# Patient Record
(demographics unavailable — no encounter records)

---

## 2024-11-15 NOTE — HISTORY OF PRESENT ILLNESS
[FreeTextEntry1] : Ms. CHIOMA TYLER a 62 year White female  presents today for  a hepatology appointment. She used to be seen by Dr. Prasanna Herron and Dr. Rodriugez previously.   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 post OLT 1/27/2023.  CMV risk-- intermediate (D/R negatiev/positive).  Post surgical noted with mild increase of LFTs effectively managed with a gradual reduction in her solumedrol dosage. Subsequent to her OLT, the patient was discharged from [facility] on February 24, 2023. Due to a bout of diarrhea (with negative gastrointestinal PCR findings), her medication was changed from Cellcept to Myfortic 360mg twice daily.  VERA-- permcath inserted on February 6 2023, and intermittent hemodialysis (HD) was carried out during her stay at [facility]hab, with the final HD session taking place on February 28. The HD catheter was then removed on March 9. Stable CKD with Cr around 1.5 with minimizing CNI.   Psych Hx: Actively seeking assistance before OLT, attends [facility] addiction program at [place] three times a week as part of her recovery program led by Dr. Hugo Caputo, with psychiatric care provided by Dr. Albert. In her social history, it is noted that she resides in [place] and has refrained from alcohol use since her last drink on December 25, 2022.  She denies any history of tobacco or illicit drug use, herbs and dietary supplements.  There is no family history of liver disease.  Expressed a strong craving for alcohol and is open to participating in an inpatient alcohol rehabilitation program. PETH positive >400 since August 2023.  9/2023 [facility] inpatient addiction program but decided to leave against medical advice (AMA) due to concerns about her medication regimen. It's worth noting that she has been consuming alcohol during this time, typically having two glasses of wine once a week and occasionally during social events. She mentioned that she drinks alongside her . Started acamprosate per Dr. Rodriguez.  11/2023 She started Vivitrol about a month ago and no longer taking Acampro. Peth continue to be positive.  Last Vivitrol shot 2/2024.    Interval history LOV 6/27/2024 - Patient present to clinic today appearing well.  - Missed her last appointment with us in October. Was busy taking care of her mother with recent dx of cancer. Mother lives in [facility] and difficult to travel back and forth to [place] per patient. - She has been compliant with attending meetings in [facility] and psychiatry appointments.  - She admits to ongoing alcohol use but states only on occasions last drink was a few days ago had 3 glasses of champagne to celebrate her mom's birthday.  - She shared her anxiety regarding her mother's health, son's upcoming wedding and daughter who now have little contact with.  - Noted ventral hernia around the left upper abdomen surgical incision site. reducible. Non tender.  - Denies fever, chills, nausea, vomiting, jaundice, melena, confusion or unintentional weight loss. - Planning to see plastic surgery for breast reduction since this has been the source of her back pain.    Current IS:  Cyclo 75/50 BID  Myfortic 180 BID  Pred 5 PPhx: ASA, Calcium, Keppra, PPI.   Last imaging:  MRI ABD done in 7/2024 IMPRESSION: New mild diffuse hepatic steatosis compared with prior MRI 8/3/2023. Stable 0.8 cm left hepatic lobe hemangioma. No suspicious focal liver lesions. No biliary stricture. No biliary ductal dilatation. Small likely hemorrhagic collection medial to the pancreatic uncinate process is decreased in size compared with CT 4/6/2023, possibly evolving postoperative hematoma or pancreatic pseudocyst. Continued attention on follow-up is advised.

## 2024-11-15 NOTE — HISTORY OF PRESENT ILLNESS
[FreeTextEntry1] : Ms. CHIOMA TYLER a 62 year White female  presents today for  a hepatology appointment. She used to be seen by Dr. Prasanna Herron and Dr. Rodriguez previously.   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 post OLT 1/27/2023.  CMV risk-- intermediate (D/R negatiev/positive).  Post surgical noted with mild increase of LFTs effectively managed with a gradual reduction in her solumedrol dosage. Subsequent to her OLT, the patient was discharged from [facility] on February 24, 2023. Due to a bout of diarrhea (with negative gastrointestinal PCR findings), her medication was changed from Cellcept to Myfortic 360mg twice daily.  VERA-- permcath inserted on February 6 2023, and intermittent hemodialysis (HD) was carried out during her stay at [facility], with the final HD session taking place on February 28. The HD catheter was then removed on March 9. Stable CKD with Cr around 1.5 with minimizing CNI.   Psych Hx: Actively seeking assistance before OLT, attends [facility]women's addiction program at [place] three times a week as part of her recovery program led by Dr. Hugo Caputo, with psychiatric care provided by Dr. Albert. In her social history, it is noted that she resides in [place] and has refrained from alcohol use since her last drink on December 25, 2022.  She denies any history of tobacco or illicit drug use, herbs and dietary supplements.  There is no family history of liver disease.  Expressed a strong craving for alcohol and is open to participating in an inpatient alcohol rehabilitation program. PETH positive >400 since August 2023.  9/2023 [facility] inpatient addiction program but decided to leave against medical advice (AMA) due to concerns about her medication regimen. It's worth noting that she has been consuming alcohol during this time, typically having two glasses of wine once a week and occasionally during social events. She mentioned that she drinks alongside her . Started acamprosate per Dr. Rodriguez.  11/2023 She started Vivitrol about a month ago and no longer taking Acampro. Peth continue to be positive.  Last Vivitrol shot 2/2024.    Interval history LOV 6/27/2024 - Patient present to clinic today appearing well.  - Missed her last appointment with us in October. Was busy taking care of her mother with recent dx of cancer. Mother lives in [facility] and difficult to travel back and forth to [place] per patient. - She has been compliant with attending meetings in [facility] and psychiatry appointments.  - She admits to ongoing alcohol use but states only on occasions last drink was a few days ago had 3 glasses of champagne to celebrate her mom's birthday.  - She shared her anxiety regarding her mother's health, son's upcoming wedding and daughter who now have little contact with.  - Noted ventral hernia around the left upper abdomen surgical incision site. reducible. Non tender.  - Denies fever, chills, nausea, vomiting, jaundice, melena, confusion or unintentional weight loss. - Planning to see plastic surgery for breast reduction since this has been the source of her back pain.    Current IS:  Cyclo 75/50 BID  Myfortic 180 BID  Pred 5 PPhx: ASA, Calcium, Keppra, PPI.   Last imaging:  MRI ABD done in 7/2024 IMPRESSION: New mild diffuse hepatic steatosis compared with prior MRI 8/3/2023. Stable 0.8 cm left hepatic lobe hemangioma. No suspicious focal liver lesions. No biliary stricture. No biliary ductal dilatation. Small likely hemorrhagic collection medial to the pancreatic uncinate process is decreased in size compared with CT 4/6/2023, possibly evolving postoperative hematoma or pancreatic pseudocyst. Continued attention on follow-up is advised.

## 2024-11-15 NOTE — REVIEW OF SYSTEMS
[As Noted in HPI] : as noted in HPI [Negative] : Heme/Lymph [Fever] : no fever [Chills] : no chills [Vomiting] : no vomiting [Constipation] : no constipation [Diarrhea] : no diarrhea [Heartburn] : no heartburn [Melena] : no melena [FreeTextEntry7] : abd discomfort

## 2024-11-15 NOTE — PHYSICAL EXAM
[Non-Tender] : non-tender [General Appearance - Alert] : alert [General Appearance - In No Acute Distress] : in no acute distress [General Appearance - Well Nourished] : well nourished [Sclera] : the sclera and conjunctiva were normal [Exaggerated Use Of Accessory Muscles For Inspiration] : no accessory muscle use [Bowel Sounds] : normal bowel sounds [Abdomen Soft] : soft [Abdomen Tenderness] : non-tender [] : no hepato-splenomegaly [Abdomen Mass (___ Cm)] : no abdominal mass palpated [Abnormal Walk] : normal gait [Nail Clubbing] : no clubbing  or cyanosis of the fingernails [Skin Color & Pigmentation] : normal skin color and pigmentation [Oriented To Time, Place, And Person] : oriented to person, place, and time [Scleral Icterus] : No Scleral Icterus [Spider Angioma] : No spider angioma(s) were observed [Abdominal  Ascites] : no ascites [Splenomegaly] : no splenomegaly [Asterixis] : no asterixis observed [Jaundice] : No jaundice [Palmar Erythema] : no Palmar Erythema [Depression] : no depression [FreeTextEntry4] : Surgery scars well healed [FreeTextEntry1] : Very combative during the visit

## 2024-11-15 NOTE — ASSESSMENT
[FreeTextEntry1] : The patient is a 61-year-old female with a history of acute alcoholic hepatitis in the setting of cirrhosis-associated acute kidney injury (VERA). She underwent orthotopic liver transplantation (OLT) on January 27, 2023. Post-operation, she experienced a mild rise in liver function tests (LFTs), which was managed with a solumedrol taper. Renal function recovered, but again increased since November, which is now improving with dos reduction of Cyclosporine.  The patient is enrolled in the [facility] for alcohol relapse prevention and is taking part in the program three times a week. Tried acamprosate and Vivitrol (stopped in 2/2024) still struggling with alcohol relapse.   Plan: # Post-transplant immunosuppression management, good graft function on last blood drawn in 4/2024.  Cyclo 75/50 (keep at higher level 100-150 given ongoing alcohol use) Myfortic 180 BID  Pred 5 stop today no hx ACR PPhx: ASA, Calcium, Keppra, PPI.  - continue current dosing.    # Prophylaxis: off Valcyte, CMV PCR remained negative since Jan 17, 2024. Off Bactrim (at 6 months [facility]).  #hx Seizure (one episode pre-transplant in 2022) need to follow up with neurology in the future can consider to take off Keppra.   #Alcohol use disorder: She relapsed post LT, struggling to quit, and has been participating in an RPP.  Failed acamprosate. Self stopped Vivitrol since 2/2024 given not helping with the urges.  - Discussed with patient today agree to start vivitrol again monthly with psychiatry team. Will reach out to psych team to follow up.  - Encouraged the patient to continue attending the [facility] for alcohol relapse prevention under the supervision of Dr. Albert (Psychiatrist).  # Ventral hernia at the incision connection middle section of the abdomen - non tender, reducible.  - continue monitor. recommend wearing abdominal binder and limit abdomen exercise.  - Can consider hernia repair at one point.   # Hypertension Management: Continue to be elevated in clinic today. Has not been able to check home BP. Will start Coreg 6.25 BID instruct patient to monitor home heart rate and BP. Call back office if noted dizziness or slow HR. Can consider CCB in future as well  #CKD--Improved with Cr. down to 1.16with dose reduction in Cyclosporine.   #Gait instability-- refer to physical therapy. encourage body strength exercises, walking and swimming.   # Health Maintenence: EGD- last done in 6/13/2024 intestinal metaplasia in the stomach biopsies, repeat EGD in 3 years (June 2027). colonoscopy:  Colonoscopy--moderate diverticulosis, 9mm flat polyp removed. next due in 5 years (June 2029). Mammogram--scheduled Mammogram 11/20/2024 for screening.  DEXA--osteopenia noted on last DEXA in 6/2023 repeat in 2 years due in 6/2025 continue calcium & vitD.  Skin check--UTD last seen dermatology.  Vaccination-- Flu due in fall / COVID UTD / PCV need to follow up.   RPA in 1 month with repeat labs before

## 2024-11-15 NOTE — ASSESSMENT
[FreeTextEntry1] : The patient is a 61-year-old female with a history of acute alcoholic hepatitis in the setting of cirrhosis-associated acute kidney injury (VERA). She underwent orthotopic liver transplantation (OLT) on January 27, 2023. Post-operation, she experienced a mild rise in liver function tests (LFTs), which was managed with a solumedrol taper. Renal function recovered, but again increased since November, which is now improving with dos reduction of Cyclosporine.  The patient is enrolled in the [facility] for alcohol relapse prevention and is taking part in the program three times a week. Tried acamprosate and Vivitrol (stopped in 2/2024) still struggling with alcohol relapse.   Plan: # Post-transplant immunosuppression management, good graft function on last blood drawn in 4/2024.  Cyclo 75/50 (keep at higher level 100-150 given ongoing alcohol use) Myfortic 180 BID  Pred 5 stop today no hx ACR PPhx: ASA, Calcium, Keppra, PPI.  - continue current dosing.    # Prophylaxis: off Valcyte, CMV PCR remained negative since Jan 17, 2024. Off Bactrim (at 6 months [facility]).  #hx Seizure (one episode pre-transplant in 2022) need to follow up with neurology in the future can consider to take off Keppra.   #Alcohol use disorder: She relapsed post LT, struggling to quit, and has been participating in an RPP.  Failed acamprosate. Self stopped Vivitrol since 2/2024 given not helping with the urges.  - Discussed with patient today agree to start vivitrol again monthly with psychiatry team. Will reach out to psych team to follow up.  - Encouraged the patient to continue attending the [facility] Program for alcohol relapse prevention under the supervision of Dr. Albert (Psychiatrist).  # Ventral hernia at the incision connection middle section of the abdomen - non tender, reducible.  - continue monitor. recommend wearing abdominal binder and limit abdomen exercise.  - Can consider hernia repair at one point.   # Hypertension Management: Continue to be elevated in clinic today. Has not been able to check home BP. Will start Coreg 6.25 BID instruct patient to monitor home heart rate and BP. Call back office if noted dizziness or slow HR. Can consider CCB in future as well  #CKD--Improved with Cr. down to 1.16with dose reduction in Cyclosporine.   #Gait instability-- refer to physical therapy. encourage body strength exercises, walking and swimming.   # Health Maintenence: EGD- last done in 6/13/2024 intestinal metaplasia in the stomach biopsies, repeat EGD in 3 years (June 2027). colonoscopy:  Colonoscopy--moderate diverticulosis, 9mm flat polyp removed. next due in 5 years (June 2029). Mammogram--scheduled Mammogram 11/20/2024 for screening.  DEXA--osteopenia noted on last DEXA in 6/2023 repeat in 2 years due in 6/2025 continue calcium & vitD.  Skin check--UTD last seen dermatology.  Vaccination-- Flu due in fall / COVID UTD / PCV need to follow up.   RPA in 1 month with repeat labs before

## 2024-12-04 NOTE — PHYSICAL EXAM
[TextEntry] : General: No acute distress, Non-toxic appearing   Pulmonary: Non-labored respirations, breathing comfortably on room air  Cardio-vascular: No cyanosis, No evidence of poor perfusion to hands or extremity   Eyes: Extra-ocular movements intact.   Shoulder: Trapezius muscle hypertrophy and/or grooving  Abdomen: Abdominal chevron incisional scar present.  She does have increased abdominal adiposity especially in the upper central region.  Breast:  [] Masses/lymphadenopathy.       [] Mild Ptosis (Grade I): NAC is at level of IMF   [] Moderate Ptosis (Grade II): NAC below level of IMF   [x] Advanced Ptosis (Grade III): NAC at lowest contour of breast, well below IMF   [] Pseudoptosis: NAC at or above IMF, but parenchymal abundance below level of IMF   [] Breast fullness/deflation:  [] NAC position: [x] [place]; [] Medialized; [] Lateralized  [] NAC Sensation: [x] Right;  [x] Left: And symmetric. [] Breast skin quality: Patient has a relatively thin skin with poor tone and striae.  [] IMF Skin: No rashes present but obvious perspiration.  [] The larger breast is on the: [] Right; [] Left; [x] Symmetric  [] The NAC is higher on the: [x] Right; [] Left; [] Symmetric  [] Base width: [] Right    cm; [] Left     cm  [] Notch to nipple distance: [x] Right   31cm; [] Left    34cm   [] Nipple to IMF: [x] Right   12cm; [] Left   13 cm   [] Areola diameter: [] Right    cm; [] Left    cm

## 2024-12-04 NOTE — HISTORY OF PRESENT ILLNESS
[FreeTextEntry1] : I had the pleasure of seeing [Janet Parra] today in the Plastic and Reconstructive Surgery office.   [Janet Parra] is a [62]-year-old woman with a long history of symptomatic macromastia and breast hypertrophy, presenting for evaluation. 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    Further, as it pertains to her medical comorbidities; she is seen by the transplant surgery team at [facility] and is currently undergoing discussion with the respective teams to obtain medical clearance to undergo a breast reduction.  Further, she reports her alcohol relapse is very occasional sometimes occurring during a party and having a drink.  However, she regularly attends AA alcohol support group.  Further, she sees a therapist and/or his psychiatrist for 4x per week.  In addition, she has a support system in her  as well as children.  Regarding her breast condition, she reports experiencing persistent headaches, neck pain, shoulder and back discomfort, shoulder grooving, as well as perspiration and rashes beneath her breasts. These symptoms have been a concern for many years and are primarily attributed to her breast size. Additionally, she faces challenges in self esteem as well as finding suitable bras and clothing that accommodate her large breasts.   The patient has attempted multiple conservative, non-surgical therapies, including supportive bras, physical and chiropracter therapy for 10-15 years, exercise, and medications such as Tylenol and NSAIDs, with minimal relief. She has also tried various topical ointments and creams.   Her current bra size is [ 38DD Bra Size], and she desires to reduce her size to [a appropriat size] to achieve better proportionality with her body.   She is interested in pursuing breast reduction surgery to alleviate her symptoms and achieve her desired breast size.   [] Weight loss/gain past 12 months. [] Yes [x] No  [] Weight loss medications: [] Yes [x] No    [] Breast discharge, breast lumps or masses: [] Yes [x] No   [] Personal or family history of breast cancer: [] Yes [x] No   [] Last breast mammogram and/or MRI: Mammogram performed in December 2024 at [facility] [x] Yes [] No [x] if yes, any abnormal findings: Patient reported the results were normal.  [] Breast Biopsy: [] Yes [x] No [] if yes, any abnormal findings:    [] Breast procedures: [] Yes [x] No   [] Prior breast or chest radiation:  [] Yes [x] No   [] Prior issues with bleeding and/or clotting: [] Yes [x] No   [] Current use of Aspirin, NSAIDS, Plavix and/or blood thinners: [x] Yes: Currently takes aspirin 81 mg daily [] No   [] Current steroid use: [] Yes [x] No: However, patient is currently on cyclosporine and mycophenolate for her liver transplant  [] Prior wound healing issues and/keloid formation: [] Yes [x] No   [] Tobacco, vaping, alcohol and/or illicit drug use: [x] Yes: Patient has a history of alcohol abuse disorder late intermittent relapse.  She currently is in a alcohol support group. [] No

## 2024-12-04 NOTE — PHYSICAL EXAM
[TextEntry] : General: No acute distress, Non-toxic appearing   Pulmonary: Non-labored respirations, breathing comfortably on room air  Cardio-vascular: No cyanosis, No evidence of poor perfusion to hands or extremity   Eyes: Extra-ocular movements intact.   Shoulder: Trapezius muscle hypertrophy and/or grooving  Abdomen: Abdominal chevron incisional scar present.  She does have increased abdominal adiposity especially in the upper central region.  Breast:  [] Masses/lymphadenopathy.       [] Mild Ptosis (Grade I): NAC is at level of IMF   [] Moderate Ptosis (Grade II): NAC below level of IMF   [x] Advanced Ptosis (Grade III): NAC at lowest contour of breast, well below IMF   [] Pseudoptosis: NAC at or above IMF, but parenchymal abundance below level of IMF   [] Breast fullness/deflation:  [] NAC position: [x] [place]; [] Medialized; [] Lateralized  [] NAC Sensation: [x] Right;  [x] Left: And symmetric. [] Breast skin quality: Patient has a relatively thin skin with poor tone and striae.  [] IMF Skin: No rashes present but obvious perspiration.  [] The larger breast is on the: [] Right; [] Left; [x] Symmetric  [] The NAC is higher on the: [x] Right; [] Left; [] Symmetric  [] Base width: [] Right    cm; [] Left     cm  [] Notch to nipple distance: [x] Right   31cm; [] Left    34cm   [] Nipple to IMF: [x] Right   12cm; [] Left   13 cm   [] Areola diameter: [] Right    cm; [] Left    cm

## 2024-12-04 NOTE — HISTORY OF PRESENT ILLNESS
[FreeTextEntry1] : I had the pleasure of seeing [Janet Parra] today in the Plastic and Reconstructive Surgery office.   [Janet Parra] is a [62]-year-old woman with a long history of symptomatic macromastia and breast hypertrophy, presenting for evaluation. 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    Further, as it pertains to her medical comorbidities; she is seen by the transplant surgery team at [facility] and is currently undergoing discussion with the respective teams to obtain medical clearance to undergo a breast reduction.  Further, she reports her alcohol relapse is very occasional sometimes occurring during a party and having a drink.  However, she regularly attends AA alcohol support group.  Further, she sees a therapist and/or his psychiatrist for 4x per week.  In addition, she has a support system in her  as well as children.  Regarding her breast condition, she reports experiencing persistent headaches, neck pain, shoulder and back discomfort, shoulder grooving, as well as perspiration and rashes beneath her breasts. These symptoms have been a concern for many years and are primarily attributed to her breast size. Additionally, she faces challenges in self esteem as well as finding suitable bras and clothing that accommodate her large breasts.   The patient has attempted multiple conservative, non-surgical therapies, including supportive bras, physical and chiropracter therapy for 10-15 years, exercise, and medications such as Tylenol and NSAIDs, with minimal relief. She has also tried various topical ointments and creams.   Her current bra size is [ 38DD Bra Size], and she desires to reduce her size to [a appropriat size] to achieve better proportionality with her body.   She is interested in pursuing breast reduction surgery to alleviate her symptoms and achieve her desired breast size.   [] Weight loss/gain past 12 months. [] Yes [x] No  [] Weight loss medications: [] Yes [x] No    [] Breast discharge, breast lumps or masses: [] Yes [x] No   [] Personal or family history of breast cancer: [] Yes [x] No   [] Last breast mammogram and/or MRI: Mammogram performed in December 2024 at [facility] [x] Yes [] No [x] if yes, any abnormal findings: Patient reported the results were normal.  [] Breast Biopsy: [] Yes [x] No [] if yes, any abnormal findings:    [] Breast procedures: [] Yes [x] No   [] Prior breast or chest radiation:  [] Yes [x] No   [] Prior issues with bleeding and/or clotting: [] Yes [x] No   [] Current use of Aspirin, NSAIDS, Plavix and/or blood thinners: [x] Yes: Currently takes aspirin 81 mg daily [] No   [] Current steroid use: [] Yes [x] No: However, patient is currently on cyclosporine and mycophenolate for her liver transplant  [] Prior wound healing issues and/keloid formation: [] Yes [x] No   [] Tobacco, vaping, alcohol and/or illicit drug use: [x] Yes: Patient has a history of alcohol abuse disorder late intermittent relapse.  She currently is in a alcohol support group. [] No

## 2024-12-04 NOTE — PHYSICAL EXAM
[Alert] : alert [Healthy Appearing] : healthy appearing [No Acute Distress] : no acute distress [Scleral Icterus] : no scleral icterus [EOMI] : extra ocular movement intact [PERRLA] : pupils equal, round, reactive to light and accomodation [Hepatojugular Reflux] : no hepatojugular reflux [Full ROM] : full range of motion [No Lymphadenopathy] : no lymphadenopathy [Clear to Auscultation] : lungs were clear to auscultation bilaterally [Breathing Comfortably on room air] : breathing comfortably on room air [Normal Rate] : normal rate [Regular Rhythm] : regular rhythm [Abdominal Bruit] : no abdominal bruit [Ascites Fluid Wave] : no ascites fluid wave [Splenomegaly] : no splenomegaly [Soft] : soft [Normal Bowel Sounds] : normal bowel sounds [No Edema] : no edema [No Skin Discoloration] : no skin discoloration [No Ulcers] : no ulcers [Strength in Tact] : strength in tact [Spider Angioma] : no spider angioma [Jaundice] : no jaundice [Palmar Erythema] : no palmar erythema [No Rash] : no rash [Asterixis] : no asterixis [Hepatic Encephalopathy] : hepatic encephalopathy [de-identified] : incisional hernia; reducible

## 2024-12-04 NOTE — PHYSICAL EXAM
[TextEntry] : General: No acute distress, Non-toxic appearing   Pulmonary: Non-labored respirations, breathing comfortably on room air  Cardio-vascular: No cyanosis, No evidence of poor perfusion to hands or extremity   Eyes: Extra-ocular movements intact.   Shoulder: Trapezius muscle hypertrophy and/or grooving  Abdomen: Abdominal chevron incisional scar present.  She does have increased abdominal adiposity especially in the upper central region.  Breast:  [] Masses/lymphadenopathy.       [] Mild Ptosis (Grade I): NAC is at level of IMF   [] Moderate Ptosis (Grade II): NAC below level of IMF   [x] Advanced Ptosis (Grade III): NAC at lowest contour of breast, well below IMF   [] Pseudoptosis: NAC at or above IMF, but parenchymal abundance below level of IMF   [] Breast fullness/deflation:  [] NAC position: [x] [place]; [] Medialized; [] Lateralized  [] NAC Sensation: [x] Right;  [x] Left: And symmetric. [] Breast skin quality: Patient has a relatively thin skin with poor tone and striae.  [] IMF Skin: No rashes present but obvious perspiration.  [] The larger breast is on the: [] Right; [] Left; [x] Symmetric  [] The NAC is higher on the: [x] Right; [] Left; [] Symmetric  [] Base width: [] Right    cm; [] Left     cm  [] Notch to nipple distance: [x] Right   31cm; [] Left    34cm   [] Nipple to IMF: [x] Right   12cm; [] Left   13 cm   [] Areola diameter: [] Right    cm; [] Left    cm

## 2024-12-04 NOTE — ASSESSMENT
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   Her bilateral symptomatic macromastia appears to be debilitating her quality of life.  Patient is a candidate for bilateral breast reduction surgery, a medically necessary procedure designed to alleviate the symptoms associated with breast hypertrophy and enhance her overall quality of life.  However in light of her current comorbidities especially in the setting of a liver transplant patient.  As well as intermittent alcohol relapse; patient will need medical clearance from both her transplant surgery team as well as her psychiatrist and/other therapist prior to proceeding.  We engaged in a comprehensive discussion regarding the breast reduction surgery, including the specifics of the Wise pattern incision. I carefully explained the risks and benefits of the procedure, ensuring all her questions were thoroughly addressed. She is aware that the incisions will result in permanent scarring, and we reviewed representative photographs to illustrate the anticipated outcomes. Additionally, I informed her that the surgery can be safely performed under general anesthesia and is typically conducted on an outpatient basis, with an expected recovery time of approximately six weeks.   We also reviewed the potential risks associated with the surgery, which include, but are not limited to: bleeding, infection, hematoma, seroma, abnormal scarring (including keloid formation), challenges with wound healing, breast asymmetry, hypersensitivity or numbness in the nipples, partial or complete loss of the nipple-areolar complex, permanent alterations in skin and nipple sensation, potential chronic pain, the inability to breastfeed, the possibility of requiring further surgery, and unsatisfactory cosmetic outcomes. The patient is also aware of the alternative option of pursuing symptomatic management rather than surgical intervention.   Furthermore, she understands that bra sizing can vary between manufacturers and that the size discussed during our consultation serves primarily as a communication tool. We also noted that breast size may fluctuate with weight changes or pregnancy.    [ ] The patient understands all the above [ ] Patient to obtain medical clearance from transplant surgery team, primary care provider as well as her psychiatrist and/therapist. [ ] Estimated tissue removal from each breast is at least **364 grams** to achieve her desired size.  [ ] Representative photos were taken today.  [ ] Mammogram indicated: Not indicated.   Thank you for allowing me to be part of the care team for this patient. Please feel free to contact me with any questions or concerns.

## 2024-12-04 NOTE — HISTORY OF PRESENT ILLNESS
[FreeTextEntry1] : OLT January 27 2023 for alcohol related liver disease doing generally well alcohol use disorder with relapse post transplant lfts have been stable alk phosphatase lately slightly up; MRCP no stricture immunosuppression cyclo, and myfortic, steroids stopped on aspirin seen today for incisional hernia at center of incision she feels it is getting bigger occasionally tender   O/E looks generally well lungs clear heart regular abdomen soft reducible incisional hernia at central part of the transplant incision; defect about 3cm , reducible

## 2024-12-04 NOTE — HISTORY OF PRESENT ILLNESS
[FreeTextEntry1] : I had the pleasure of seeing [Janet Parra] today in the Plastic and Reconstructive Surgery office.   [Janet Parra] is a [62]-year-old woman with a long history of symptomatic macromastia and breast hypertrophy, presenting for evaluation. 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    Further, as it pertains to her medical comorbidities; she is seen by the transplant surgery team at [facility] and is currently undergoing discussion with the respective teams to obtain medical clearance to undergo a breast reduction.  Further, she reports her alcohol relapse is very occasional sometimes occurring during a party and having a drink.  However, she regularly attends AA alcohol support group.  Further, she sees a therapist and/or his psychiatrist for 4x per week.  In addition, she has a support system in her  as well as children.  Regarding her breast condition, she reports experiencing persistent headaches, neck pain, shoulder and back discomfort, shoulder grooving, as well as perspiration and rashes beneath her breasts. These symptoms have been a concern for many years and are primarily attributed to her breast size. Additionally, she faces challenges in self esteem as well as finding suitable bras and clothing that accommodate her large breasts.   The patient has attempted multiple conservative, non-surgical therapies, including supportive bras, physical and chiropracter therapy for 10-15 years, exercise, and medications such as Tylenol and NSAIDs, with minimal relief. She has also tried various topical ointments and creams.   Her current bra size is [ 38DD Bra Size], and she desires to reduce her size to [a appropriat size] to achieve better proportionality with her body.   She is interested in pursuing breast reduction surgery to alleviate her symptoms and achieve her desired breast size.   [] Weight loss/gain past 12 months. [] Yes [x] No  [] Weight loss medications: [] Yes [x] No    [] Breast discharge, breast lumps or masses: [] Yes [x] No   [] Personal or family history of breast cancer: [] Yes [x] No   [] Last breast mammogram and/or MRI: Mammogram performed in December 2024 at [facility] [x] Yes [] No [x] if yes, any abnormal findings: Patient reported the results were normal.  [] Breast Biopsy: [] Yes [x] No [] if yes, any abnormal findings:    [] Breast procedures: [] Yes [x] No   [] Prior breast or chest radiation:  [] Yes [x] No   [] Prior issues with bleeding and/or clotting: [] Yes [x] No   [] Current use of Aspirin, NSAIDS, Plavix and/or blood thinners: [x] Yes: Currently takes aspirin 81 mg daily [] No   [] Current steroid use: [] Yes [x] No: However, patient is currently on cyclosporine and mycophenolate for her liver transplant  [] Prior wound healing issues and/keloid formation: [] Yes [x] No   [] Tobacco, vaping, alcohol and/or illicit drug use: [x] Yes: Patient has a history of alcohol abuse disorder late intermittent relapse.  She currently is in a alcohol support group. [] No

## 2025-01-09 NOTE — REVIEW OF SYSTEMS
[Abdominal Pain] : abdominal pain [Joint Pain] : joint pain [Muscle Pain] : muscle pain [Back Pain] : back pain [Negative] : Heme/Lymph

## 2025-01-09 NOTE — ASSESSMENT
[High Risk Surgery - Intraperitoneal, Intrathoracic or Supringuinal Vascular Procedures] : High Risk Surgery - Intraperitoneal, Intrathoracic or Supringuinal Vascular Procedures - Yes (1) [Ischemic Heart Disease] : Ischemic Heart Disease - No (0) [Congestive Heart Failure] : Congestive Heart Failure - No (0) [Prior Cerebrovascular Accident or TIA] : Prior Cerebrovascular Accident or TIA - No (0) [Creatinine >= 2mg/dL (1 Point)] : Creatinine >= 2mg/dL - No (0) [Insulin-dependent Diabetic (1 Point)] : Insulin-dependent Diabetic - No (0) [1] : 1 , RCRI Class: II, Risk of Post-Op Cardiac Complications: 6.0%, 95% CI for Risk Estimate: 4.9% - 7.4% [Patient Optimized for Surgery] : Patient optimized for surgery [As per surgery] : as per surgery [FreeTextEntry4] : Completed physical Exam, reviewed pre op labs, EKG.  Advised pt to avoid any NSAIDs, vitamins, aspirin for 7 days till post surgery. cyclosporine and Keppra defer to transplant specialist and neurologist.  Avoid any food or drinks past midnight, the day prior to surgery.  Pt may take rest of the medication with small sips of water the day of the surgery.  RTO for a follow up appointment post surgery.   RCRI Class II for procedure Pt at current time has been optimized to proceed with surgery

## 2025-01-09 NOTE — RESULTS/DATA
[] : results reviewed [de-identified] : 12/31/2024: Results within acceptable range [de-identified] : 12/31/2024: Results within acceptable range [de-identified] : 03/2024: NSR 98 bpm, baseline
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
## 2025-01-09 NOTE — PHYSICAL EXAM
[No Acute Distress] : no acute distress [PERRL] : pupils equal round and reactive to light [EOMI] : extraocular movements intact [Normal Oropharynx] : the oropharynx was normal [Supple] : supple [Clear to Auscultation] : lungs were clear to auscultation bilaterally [Normal S1, S2] : normal S1 and S2 [No Edema] : there was no peripheral edema [Soft] : abdomen soft [Normal Bowel Sounds] : normal bowel sounds [No Rash] : no rash [No Focal Deficits] : no focal deficits [Normal Affect] : the affect was normal [de-identified] : incisional hernia noted epigastric [de-identified] : upper and lower back pain with rom [de-identified] : with cane

## 2025-01-21 NOTE — ASSESSMENT
[FreeTextEntry1] : Patient is two years post liver transplant complicated by central incisional hernia. Repaired last week

## 2025-01-21 NOTE — HISTORY OF PRESENT ILLNESS
[FreeTextEntry1] : Patient was seen today at the clinic for follow up after she got her post liver transplant incisional hernia repaired last week with Dr. Delgado.  Patient reports feeling well today with no major complaint at this time. She reports her pain is still there but getting better everyday. She has been eating well and ambulating. She has normal bowel movements. She denies fever, no nausea or vomiting, no diarrhea or constipation.  The incision is covered with Steri-Strips which looks dry. The incision underneath looks grossly dry and intact.

## 2025-01-21 NOTE — PHYSICAL EXAM
[No Acute Distress] : no acute distress [Supple] : the neck was supple [Breathing Comfortably on room air] : breathing comfortably on room air [Clean] : clean [Dry] : dry [Healing Well] : healing well [No Edema] : no edema [No Skin Discoloration] : no skin discoloration [Scleral Icterus] : no scleral icterus [Foul Odor] : no foul smell [Purulent Drainage] : no purulent drainage [Erythema] : not erythematous [de-identified] : Soft, non tender [de-identified] : Central incision covered with dry Steri-Strips

## 2025-01-21 NOTE — PLAN
[FreeTextEntry1] : Patient is doing well. Pain is adequately controlled. Patient is instructed to keep Steri-Strips and to avoid heavy lifting. A follow up appointment will be scheduled after two weeks.

## 2025-01-29 NOTE — PHYSICAL EXAM
[TextEntry] : General: No acute distress, Non-toxic appearing  Psych: Alert and oriented x 3, with normal mood and affect Pulmonary: Non-labored respirations, Breathing comfortably on room air Cardio-vascular: No cyanosis, No evidence of poor perfusion to hands or face Eyes: Extra-ocular movements intact.   Surgical site: Bilateral Wise pattern breast incision intact and well-approximated with no evidence of vertical limb and/or T-junction dehiscence. Incision with Dermabond in place as well as dried up old serosanguineous drainage. No evidence of infection and/or fluid collection.  Mild postoperative bruising noted in the skin flaps appears to be resolving.  Expected postoperative swelling still present.  Bilateral NAC at the most projected portion of the breast. They are viable with intact sensation

## 2025-01-29 NOTE — HISTORY OF PRESENT ILLNESS
[FreeTextEntry1] : [Janet Parra] is a [62]-year-old [female] who is post-operative following [Bilateral breast reduction] on [01/13/2025], presenting today for their [2] weeks follow-up appointment.   01/29: Patient was seen in the office today.  She appeared in good spirits.  She reports no postoperative pain since surgery.  She denies any changes in sensation.  She is very happy about undergoing surgery and feels that she can stand up straighter.  She has been wearing her supportive postoperative bra. She reports she wears her supportive bra over A T-shirt because the Velcro on the superior aspect of her bra irritates her skin when in direct contact. Patient reports she is happy with the way her breasts feels but currently too shy to look herself in the mirror. Patient reports she continues to shower but after she noted a small serosanguineous staining on her bra she avoided showering for some days since she was afraid she may hurt something.  Of note, patient reported that she has followed up with her transplant surgeon who completed her hernia repair and per report she is doing well with no issues.

## 2025-01-29 NOTE — ASSESSMENT
[FreeTextEntry1] : [Janet Parra] is a [62]-year-old [female] who is post-operative following [Bilateral breast reduction] on [01/13/2025], presenting today for their [2] weeks follow-up appointment. Patient appears to be doing well and is very satisfied.  Her incisions are intact bilaterally with no clinical signs to suggest infection.  She reports a lot of her preoperative symptoms is not resolved.  She denies any pain.  - Postoperative Care and Recommendations   - Maintain Scar Precautions: Keep the incision clean and dry. Patient advised that she could continue her daily showers with soap and water to allow to run down the site with mild scrubbing. Avoid direct sunlight on the scar for at least six months, as sun exposure can darken or highlight scar tissue.   - Activity Restrictions: Continue to avoid lifting items over 10 pounds for six weeks post-surgery.  - Patient advised to continue to wear supportive bra and okay to transition to a sports bra.  She should continue to avoid any underwire bra for at least 6 weeks after surgery.   - Scar Massage: Two weeks post-surgery, begin gentle scar massage to help break down scar tissue and improve flexibility. Use small, circular motions with gentle pressure.   - Sun Protection for Scars: Once healed enough, apply SPF 30 or higher on the scar to shield from UV exposure, which may darken scar tissue.   - Monitor for Infection: Observe for any signs of infection and report if symptoms arise.   - Follow-Up Appointment: Scheduled for [4 weeks].

## 2025-03-05 NOTE — PHYSICAL EXAM
[TextEntry] : General: No acute distress, Non-toxic appearing Psych: Alert and oriented x 3, with normal mood and affect Pulmonary: Non-labored respirations, Breathing comfortably on room air Cardio-vascular: No cyanosis, No evidence of poor perfusion to hands or face Eyes: Extra-ocular movements intact.  Surgical site: Bilateral Wise pattern breast incision intact and well-approximated with no evidence of vertical limb and/or T-junction dehiscence. Incisions are healing nicely with no clinical evidence to suggest infection.  Bilateral NAC viable with intact sensation.  She does have moderate firmness in the lateral breast mound.  Otherwise most of the postoperative swelling appears to be resolving.

## 2025-03-05 NOTE — HISTORY OF PRESENT ILLNESS
[FreeTextEntry1] : [Janet Parra] is a [62]-year-old [female] who is post-operative following [Bilateral breast reduction] on [01/13/2025], presenting today for their [7] weeks follow-up appointment.  03/05: Patient presents today for a 7 weeks follow-up appointment.  Of note, since she was last seen she was admitted to [facility] on 02/11/2025 as a result of melena and symptomatic acute on chronic anemia requiring blood transfusion and subsequent EGD that demonstrated atrophic gastritis with no other source of GI bleeding identified. She has since been discharged with outpatient follow-up with gastroenterology.  As a pertains of breast, she reports the left lateral breast mound feels slightly firm.  She denies any constitutional and/or local symptoms of infection.  She reports intact sensation in bilateral nipples.  She reports her incision continues to heal well without any issues.  She is very satisfied with the appearance of her breast.   01/29: Patient was seen in the office today.  She appeared in good spirits.  She reports no postoperative pain since surgery.  She denies any changes in sensation.  She is very happy about undergoing surgery and feels that she can stand up straighter.  She has been wearing her supportive postoperative bra. She reports she wears her supportive bra over A T-shirt because the Velcro on the superior aspect of her bra irritates her skin when in direct contact. Patient reports she is happy with the way her breasts feels but currently too shy to look herself in the mirror. Patient reports she continues to shower but after she noted a small serosanguineous staining on her bra she avoided showering for some days since she was afraid she may hurt something.  Of note, patient reported that she has followed up with her transplant surgeon who completed her hernia repair and per report she is doing well with no issues.

## 2025-03-05 NOTE — ASSESSMENT
[FreeTextEntry1] : [Janet Parra] is a [62]-year-old [female] who is post-operative following [Bilateral breast reduction] on [01/13/2025], presenting today for their [7] weeks follow-up appointment. Patient appears to be doing well and is very satisfied. Her incisions are intact bilaterally with no clinical signs to suggest infection. She reports a lot of her preoperative symptoms is not resolved. She denies any pain.  - Postoperative Care and Recommendations  - Maintain Scar Precautions: Avoid direct sunlight on the scar for at least six months, as sun exposure can darken or highlight scar tissue.  - Patient advised to continue to wear supportive bra and okay to transition to a sports bra. She should continue to avoid any underwire bra for at least 6 weeks after surgery.  - Scar Massage: Two weeks post-surgery, begin gentle scar massage to help break down scar tissue and improve flexibility. Use small, circular motions with gentle pressure.  - Sun Protection for Scars: Once healed enough, apply SPF 30 or higher on the scar to shield from UV exposure, which may darken scar tissue.  - Follow-Up Appointment: Scheduled for [5 weeks].
[FreeTextEntry1] : [Janet Parra] is a [62]-year-old [female] who is post-operative following [Bilateral breast reduction] on [01/13/2025], presenting today for their [7] weeks follow-up appointment. Patient appears to be doing well and is very satisfied. Her incisions are intact bilaterally with no clinical signs to suggest infection. She reports a lot of her preoperative symptoms is not resolved. She denies any pain.  - Postoperative Care and Recommendations  - Maintain Scar Precautions: Avoid direct sunlight on the scar for at least six months, as sun exposure can darken or highlight scar tissue.  - Patient advised to continue to wear supportive bra and okay to transition to a sports bra. She should continue to avoid any underwire bra for at least 6 weeks after surgery.  - Scar Massage: Two weeks post-surgery, begin gentle scar massage to help break down scar tissue and improve flexibility. Use small, circular motions with gentle pressure.  - Sun Protection for Scars: Once healed enough, apply SPF 30 or higher on the scar to shield from UV exposure, which may darken scar tissue.  - Follow-Up Appointment: Scheduled for [5 weeks].
30-Jun-2023 15:33

## 2025-03-05 NOTE — HISTORY OF PRESENT ILLNESS
[FreeTextEntry1] : [Janet Parra] is a [62]-year-old [female] who is post-operative following [Bilateral breast reduction] on [01/13/2025], presenting today for their [7] weeks follow-up appointment.  03/05: Patient presents today for a 7 weeks follow-up appointment.  Of note, since she was last seen she was admitted to [facility] on 02/11/2025 as a result of melena and symptomatic acute on chronic anemia requiring blood transfusion and subsequent EGD that demonstrated atrophic gastritis with no other source of GI bleeding identified. She has since been discharged with outpatient follow-up with gastroenterology.  As a pertains of breast, she reports the left lateral breast mound feels slightly firm.  She denies any constitutional and/or local symptoms of infection.  She reports intact sensation in bilateral nipples.  She reports her incision continues to heal well without any issues.  She is very satisfied with the appearance of her breast.   01/29: Patient was seen in the office today.  She appeared in good spirits.  She reports no postoperative pain since surgery.  She denies any changes in sensation.  She is very happy about undergoing surgery and feels that she can stand up straighter.  She has been wearing her supportive postoperative bra. She reports she wears her supportive bra over A T-shirt because the Velcro on the superior aspect of her bra irritates her skin when in direct contact. Patient reports she is happy with the way her breasts feels but currently too shy to look herself in the mirror. Patient reports she continues to shower but after she noted a small serosanguineous staining on her bra she avoided showering for some days since she was afraid she may hurt something.  Of note, patient reported that she has followed up with her transplant surgeon who completed her hernia repair and per report she is doing well with no issues.

## 2025-04-08 NOTE — HISTORY OF PRESENT ILLNESS
[FreeTextEntry1] : [Janet Parra] is a [62]-year-old [female] who is post-operative following [Bilateral breast reduction] on [01/13/2025], presenting today for their [12] weeks follow-up appointment.  04/09:   03/05: Patient presents today for a 7 weeks follow-up appointment.  Of note, since she was last seen she was admitted to [facility] on 02/11/2025 as a result of melena and symptomatic acute on chronic anemia requiring blood transfusion and subsequent EGD that demonstrated atrophic gastritis with no other source of GI bleeding identified. She has since been discharged with outpatient follow-up with gastroenterology.  As a pertains of breast, she reports the left lateral breast mound feels slightly firm.  She denies any constitutional and/or local symptoms of infection.  She reports intact sensation in bilateral nipples.  She reports her incision continues to heal well without any issues.  She is very satisfied with the appearance of her breast.   01/29: Patient was seen in the office today.  She appeared in good spirits.  She reports no postoperative pain since surgery.  She denies any changes in sensation.  She is very happy about undergoing surgery and feels that she can stand up straighter.  She has been wearing her supportive postoperative bra. She reports she wears her supportive bra over A T-shirt because the Velcro on the superior aspect of her bra irritates her skin when in direct contact. Patient reports she is happy with the way her breasts feels but currently too shy to look herself in the mirror. Patient reports she continues to shower but after she noted a small serosanguineous staining on her bra she avoided showering for some days since she was afraid she may hurt something.  Of note, patient reported that she has followed up with her transplant surgeon who completed her hernia repair and per report she is doing well with no issues.

## 2025-04-08 NOTE — ASSESSMENT
[FreeTextEntry1] : [Janet Parra] is a [62]-year-old [female] who is post-operative following [Bilateral breast reduction] on [01/13/2025], presenting today for their [12] weeks follow-up appointment. Patient appears to be doing well and is very satisfied. Her incisions are intact bilaterally with no clinical signs to suggest infection. She reports a lot of her preoperative symptoms is not resolved. She denies any pain.  - Postoperative Care and Recommendations  - 3 months photographs obtained   - Maintain Scar Precautions: Avoid direct sunlight on the scar for at least six months, as sun exposure can darken or highlight scar tissue.  - Scar Massage: Two weeks post-surgery, begin gentle scar massage to help break down scar tissue and improve flexibility. Use small, circular motions with gentle pressure.  - Sun Protection for Scars: Once healed enough, apply SPF 30 or higher on the scar to shield from UV exposure, which may darken scar tissue.  - Follow-Up Appointment: Scheduled for [3 months].

## 2025-04-15 NOTE — ASSESSMENT
Spoke with patient and she is 7 months post op.  She is still having issues with left leg and foot drop.  Gait is stiff.  Was taking max ibuprofen and tylenol but cut back to half.  Tramadol occasionally half tablet.  Used gel in past but not effective. Biofreeze helps sometimes.  Was doing physical therapy but go into a \"funk\" and  Stopped.  Was doing Silver sneaker but closed.  Stationary bike at home used sometimes.  Unsure if she would like to resume.  Concerned that foot drop getting worse- thought brace or boot mentioned at appointment .  What are options?    Will address with Dr. John Delacruz .      Discussed with Dr. John Delacruz and he would like her to resume physical therapy and work on straightening and follow up with him in 6 weeks.  She had partial foot drop, nerve damage can take 1-2 yrs to fully healed.  If not  Getting  Better  After physical therapy , will address possible options for bracing or inserts for shoe.  May try Mobic to decrease ibuprofen amount    Voice message left with patient to call office   [FreeTextEntry1] : [Janet Parra] is a [62]-year-old [female] who is post-operative following [Bilateral breast reduction] on [01/13/2025], presenting today for their [13] weeks follow-up appointment. Patient appears to be doing well and is very satisfied. Her incisions are intact bilaterally with no clinical signs to suggest infection. She reports a lot of her preoperative symptoms is not resolved. She denies any pain.  - Postoperative Care and Recommendations  - Maintain Scar Precautions: Avoid direct sunlight on the scar for at least six months, as sun exposure can darken or highlight scar tissue.  - Patient advised to continue to wear supportive bra and okay to transition to a sports bra. She should continue to avoid any underwire bra for at least 6 weeks after surgery.  - Scar Massage: Two weeks post-surgery, begin gentle scar massage to help break down scar tissue and improve flexibility. Use small, circular motions with gentle pressure.  - Sun Protection for Scars: Once healed enough, apply SPF 30 or higher on the scar to shield from UV exposure, which may darken scar tissue.  - Follow-Up Appointment: Scheduled for [3 months].

## 2025-04-15 NOTE — HISTORY OF PRESENT ILLNESS
[FreeTextEntry1] : [Janet Parra] is a [62]-year-old [female] who is post-operative following [Bilateral breast reduction] on [01/13/2025], presenting today for their [13] weeks follow-up appointment.  04/16:   03/05: Patient presents today for a 7 weeks follow-up appointment.  Of note, since she was last seen she was admitted to [facility] on 02/11/2025 as a result of melena and symptomatic acute on chronic anemia requiring blood transfusion and subsequent EGD that demonstrated atrophic gastritis with no other source of GI bleeding identified. She has since been discharged with outpatient follow-up with gastroenterology.  As a pertains of breast, she reports the left lateral breast mound feels slightly firm.  She denies any constitutional and/or local symptoms of infection.  She reports intact sensation in bilateral nipples.  She reports her incision continues to heal well without any issues.  She is very satisfied with the appearance of her breast.   01/29: Patient was seen in the office today.  She appeared in good spirits.  She reports no postoperative pain since surgery.  She denies any changes in sensation.  She is very happy about undergoing surgery and feels that she can stand up straighter.  She has been wearing her supportive postoperative bra. She reports she wears her supportive bra over A T-shirt because the Velcro on the superior aspect of her bra irritates her skin when in direct contact. Patient reports she is happy with the way her breasts feels but currently too shy to look herself in the mirror. Patient reports she continues to shower but after she noted a small serosanguineous staining on her bra she avoided showering for some days since she was afraid she may hurt something.  Of note, patient reported that she has followed up with her transplant surgeon who completed her hernia repair and per report she is doing well with no issues.

## 2025-04-23 NOTE — HISTORY OF PRESENT ILLNESS
[FreeTextEntry1] : [Janet Parra] is a [62]-year-old [female] who is post-operative following [Bilateral breast reduction] on [01/13/2025], presenting today for their [~13] weeks follow-up appointment.  04/22: Patient rescheduled her previous appointment scheduled for April 9, 2025 and April 16, 2025.  She presents today for her follow-up appointment. Patient reports she sustained a bad fall in the bathroom sometime ago which resulted in some bruising but no any other injuries.  Overall, she reports that she has been doing well.  She feels the left breast feels firmer than the right.  She also reports significant diminished sensation on the left NAC compared to the right.  She has been on and off with scar massage. She reports she wears a supportive bra but was not wearing one today. She denies any other issues.  03/05: Patient presents today for a 7 weeks follow-up appointment.  Of note, since she was last seen she was admitted to [facility] on 02/11/2025 as a result of melena and symptomatic acute on chronic anemia requiring blood transfusion and subsequent EGD that demonstrated atrophic gastritis with no other source of GI bleeding identified. She has since been discharged with outpatient follow-up with gastroenterology.  As a pertains of breast, she reports the left lateral breast mound feels slightly firm.  She denies any constitutional and/or local symptoms of infection.  She reports intact sensation in bilateral nipples.  She reports her incision continues to heal well without any issues.  She is very satisfied with the appearance of her breast.   01/29: Patient was seen in the office today.  She appeared in good spirits.  She reports no postoperative pain since surgery.  She denies any changes in sensation.  She is very happy about undergoing surgery and feels that she can stand up straighter.  She has been wearing her supportive postoperative bra. She reports she wears her supportive bra over A T-shirt because the Velcro on the superior aspect of her bra irritates her skin when in direct contact. Patient reports she is happy with the way her breasts feels but currently too shy to look herself in the mirror. Patient reports she continues to shower but after she noted a small serosanguineous staining on her bra she avoided showering for some days since she was afraid she may hurt something.  Of note, patient reported that she has followed up with her transplant surgeon who completed her hernia repair and per report she is doing well with no issues.

## 2025-04-23 NOTE — ASSESSMENT
[FreeTextEntry1] : [Janet Parra] is a [62]-year-old [female] who is post-operative following [Bilateral breast reduction] on [01/13/2025], presenting today for their [13] weeks follow-up appointment. Patient appears to be doing well and is very satisfied. Her incisions are intact bilaterally with no clinical signs to suggest infection. Overall, patient is very satisfied with the outcome of her result in reports her preoperative symptoms has significantly improved and she walks around with confidence.  As it pertains to her left breast mound firmness, encouraged to continue aggressive massage and to wear supportive compressive bra.  - Postoperative Care and Recommendations  - 3 months photographs obtained  - Maintain Scar Precautions: Avoid direct sunlight on the scar for at least six months, as sun exposure can darken or highlight scar tissue.  - Scar Massage: Two weeks post-surgery, begin gentle scar massage to help break down scar tissue and improve flexibility. Use small, circular motions with gentle pressure.  - Sun Protection for Scars: Once healed enough, apply SPF 30 or higher on the scar to shield from UV exposure, which may darken scar tissue.  - Follow-Up Appointment: Scheduled for [3 months].

## 2025-04-23 NOTE — PHYSICAL EXAM
[TextEntry] : General: No acute distress, Non-toxic appearing Psych: Alert and oriented x 3, with normal mood and affect Pulmonary: Non-labored respirations, Breathing comfortably on room air Cardio-vascular: No cyanosis, No evidence of poor perfusion to hands or face Eyes: Extra-ocular movements intact.  Surgical site: Bilateral Wise pattern breast incision intact and well-approximated with no evidence of vertical limb and/or T-junction dehiscence. Incisions are healing nicely with no clinical evidence to suggest infection. Right NAC sensation intact.  Left NAC sensation significantly diminished. Left breast mound palpated and significantly firmer and less pliable when compared to right. No fluid collection.

## 2025-04-25 NOTE — PHYSICAL EXAM
[de-identified] : Constitutional:  62 year old female, alert and oriented, cooperative, in no acute distress.  HEENT  NC/AT.  Appearance: symmetric  Neck/Back Straight without deformity or instability.  Good ROM.  Chest/Respiratory  Respiratory effort: no intercostal retractions or use of accessory muscles. Nonlabored Breathing  Skin  On inspection, warm and dry without rashes or lesions.  Mental Status:  Judgment, insight: intact Orientation: oriented to time, place, and person  Neurological: Sensory and Motor are grossly intact throughout  Left Knee  Inspection:     Skin intact, no rashes or lesions     No Effusion     Tenderness throughout the knee  Range of Motion: 	Extension - 0 degrees 	Flexion - 120 degrees 	Extensor lag: None  Stability:      Demonstrates no Varus or Valgus instability      Negative Anterior or Posterior drawer.      Negative Lachman's  Patella: stable, tracks well.   Neurologic Exam     Motor intact including 5/5 Extensor Hallucis Longus, 5/5 Flexor Hallucis Longus, 5/5 Tibialis Anterior and 5/5 Gastrocnemius     Sensation Intact to Light Touch including Saphenous, Sural, Superficial Peroneal, Deep Peroneal, Tibial nerve distributions  Vascular Exam     Foot is warm and well perfused with 2+ Dorsalis Pedis Pulse   No pain with range of motion of the bilateral hips or right knee. No lumbar paraspinal muscle tenderness. [de-identified] : XRay:  XRays of the Left Knee (4 Views) taken in the office today and discussed with the patient. XRays demonstrate no obvious fracture or dislocation. There is no significant evidence of osteoarthritis or osteophyte formation. (my personal interpretation).

## 2025-04-25 NOTE — DISCUSSION/SUMMARY
[de-identified] : Janet Parra is a 62-year-old female who presents to the office for evaluation of her left knee pain.  X-rays showed no obvious fractures or dislocations.  Examination showed tenderness throughout the knee.  Discussed with the patient the examination and findings. Discussed that patient's pain is likely secondary to a knee contusion.  Discussed treatment for patient's pain, including physical therapy.  Patient would like tramadol.  Discussed that tramadol is a narcotic medication and that is not indicated at this time.  Patient continuing to request tramadol during visit.  Patient became upset regarding discussion of medical history and pain medications.  Patient declined further discussion regarding workup/MRI and physical therapy.  Offered to help patient with alternative providers as she has seen Dr. Dean in the past.  Patient left the visit at this time.  Plan: -Rest, Ice, Elevation of the knee

## 2025-05-01 NOTE — PHYSICAL EXAM
[de-identified] : Patient is WDWN, alert, and in no acute distress. Breathing is unlabored. She is grossly oriented to person, place, and time. Her  is here. Left Wrist -  Inspection/Palpation: The short arm splint was removed. She has a gross wrist deformity, edema, ecchymosis and tenderness ..  Range of Motion: Full in the digits Sensation is intact.    [de-identified] : AP, lateral and oblique views of the left wrist were obtained today and revealed Severely comminuted/impacted distal radial fracture with dorsal angulation of the segments and Minimally displaced ulnar styloid process fracture.   ACC: [id number] EXAM: XR FOREARM 2 VIEWS LT ORDERED BY: LAVELLE DELANEY  ACC: [id number] EXAM: XR WRIST COMP MIN 3 VIEWS LT ORDERED BY: LAVELLE DELANEY  PROCEDURE DATE: 04/28/2025    INTERPRETATION: Clinical history: 62-year-old female, injury.  Three views of the left wrist and 2 views of left forearm.  FINDINGS: Severely comminuted/impacted distal radial fracture with dorsal angulation of the segments.  Minimally displaced ulnar styloid process fracture.  IMPRESSION:  Comminuted/intra-articular distal radial fracture with impaction.  Minimally displaced ulnar styloid process fracture  --- End of Report ---

## 2025-05-01 NOTE — PHYSICAL EXAM
[de-identified] : Patient is WDWN, alert, and in no acute distress. Breathing is unlabored. She is grossly oriented to person, place, and time. Her  is here. Left Wrist -  Inspection/Palpation: The short arm splint was removed. She has a gross wrist deformity, edema, ecchymosis and tenderness ..  Range of Motion: Full in the digits Sensation is intact.    [de-identified] : AP, lateral and oblique views of the left wrist were obtained today and revealed Severely comminuted/impacted distal radial fracture with dorsal angulation of the segments and Minimally displaced ulnar styloid process fracture.   ACC: [id number] EXAM: XR FOREARM 2 VIEWS LT ORDERED BY: LAVELLE DELANEY  ACC: [id number] EXAM: XR WRIST COMP MIN 3 VIEWS LT ORDERED BY: LAVELLE DELANEY  PROCEDURE DATE: 04/28/2025    INTERPRETATION: Clinical history: 62-year-old female, injury.  Three views of the left wrist and 2 views of left forearm.  FINDINGS: Severely comminuted/impacted distal radial fracture with dorsal angulation of the segments.  Minimally displaced ulnar styloid process fracture.  IMPRESSION:  Comminuted/intra-articular distal radial fracture with impaction.  Minimally displaced ulnar styloid process fracture  --- End of Report ---

## 2025-05-01 NOTE — HISTORY OF PRESENT ILLNESS
[de-identified] : Pt is a 63 y/o female with left distal radius fracture.  She fell in her kitchen yesterday and landed on an outstretched left hand.  She had pain immediately.  She went to [place] ED where xrays revealed a right distal radius fracture.  A splint was applied and she was advised to follow up with a specialist. Gave oxycodon from hospital . She reports that she had a prior liver transplant.

## 2025-05-01 NOTE — DISCUSSION/SUMMARY
[de-identified] : The underlying pathophysiology was reviewed with the patient. XR films were reviewed with the patient. Discussed at length the nature of the patients condition. The left wrist symptoms are secondary to left distal radius fracture.   Treatment options were discussed including; closed reduction/cast treatment, surgical intervention with ORIF distal radius fracture and hardware placement.  A new left short arm splint and sling were applied for support.   The patient wishes to proceed with ORIF of the left distal radius fracture at this time. The risks and benefits were reviewed with the patient.  All of his questions were answered. She will meet with our surgical scheduler.  Patient was advised to try OTC medication and topical analgesics for pain management. I recommend that the patient utilize Tylenol, Advil, Aleve,   RX: Ibuprofen 800   All questions answered, understanding verbalized. Patient in agreement with plan of care. The patient can follow-up post-operatively.  If the patient begins to experience any changes or severe exacerbation of her symptoms, she should reach out to me as soon as possible.

## 2025-05-01 NOTE — DISCUSSION/SUMMARY
[de-identified] : The underlying pathophysiology was reviewed with the patient. XR films were reviewed with the patient. Discussed at length the nature of the patients condition. The left wrist symptoms are secondary to left distal radius fracture.   Treatment options were discussed including; closed reduction/cast treatment, surgical intervention with ORIF distal radius fracture and hardware placement.  A new left short arm splint and sling were applied for support.   The patient wishes to proceed with ORIF of the left distal radius fracture at this time. The risks and benefits were reviewed with the patient.  All of his questions were answered. She will meet with our surgical scheduler.  Patient was advised to try OTC medication and topical analgesics for pain management. I recommend that the patient utilize Tylenol, Advil, Aleve,   RX: Ibuprofen 800   All questions answered, understanding verbalized. Patient in agreement with plan of care. The patient can follow-up post-operatively.  If the patient begins to experience any changes or severe exacerbation of her symptoms, she should reach out to me as soon as possible.

## 2025-05-01 NOTE — ADDENDUM
[FreeTextEntry1] : I, oPrtia Castro wrote this note acting as a scribe for Dr. Jose Shelton on 04/29/2025.   All medical record entries made by the Scribe were at my, Dr. Jose Shelton MD., direction and personally dictated by me on 04/29/2025. I have personally reviewed the chart and agree that the record accurately reflects my personal performance of the history, physical exam, assessment and plan.

## 2025-05-01 NOTE — HISTORY OF PRESENT ILLNESS
[Moderate (4-6 METs)] : Moderate (4-6 METs) [FreeTextEntry1] : ORIF distal radius fracture and hardware placement [FreeTextEntry2] : 05/02/2025 [FreeTextEntry3] : Dr. Shelton [FreeTextEntry4] : Ms. Janet Parra presents today for preop evaluation for a preop evaluation for a  Left ORIF distal radius fracture and hardware placement to be done by Dr. Shelton on 05/02/2025 at [facility]. As per chart review, pt had been to [place] ED on 04/28/2025 for fall on an out stretched hand when she sustained a left distal radial fracture. Pt prior hx of chronic alcoholism, also s/p liver transplant, continues to see Hepatology/Transplant specialist. Pt was advised today Preop labs noted for elevated LFTs in the 300s. Pt will need Hepatology clearance for surgery. At which advise, pt stormed out of the room.  [FreeTextEntry7] : 02/2025: NSR 85 bpm, low voltage, non specific T waves

## 2025-05-01 NOTE — HISTORY OF PRESENT ILLNESS
[de-identified] : Pt is a 61 y/o female with left distal radius fracture.  She fell in her kitchen yesterday and landed on an outstretched left hand.  She had pain immediately.  She went to [place] ED where xrays revealed a right distal radius fracture.  A splint was applied and she was advised to follow up with a specialist. Gave oxycodon from hospital . She reports that she had a prior liver transplant.

## 2025-05-01 NOTE — ASSESSMENT
[Patient NOT optimized for Surgery at this time] : Patient not optimized for surgery at this time [Other: _____] : [unfilled] [As per surgery] : as per surgery [FreeTextEntry4] : Reviewed pre op labs, EKG.  Advised will need hepatology clearance prior to surgery

## 2025-05-01 NOTE — PHYSICAL EXAM
[de-identified] : Patient is WDWN, alert, and in no acute distress. Breathing is unlabored. She is grossly oriented to person, place, and time. Her  is here. Left Wrist -  Inspection/Palpation: The short arm splint was removed. She has a gross wrist deformity, edema, ecchymosis and tenderness ..  Range of Motion: Full in the digits Sensation is intact.    [de-identified] : AP, lateral and oblique views of the left wrist were obtained today and revealed Severely comminuted/impacted distal radial fracture with dorsal angulation of the segments and Minimally displaced ulnar styloid process fracture.   ACC: [id number] EXAM: XR FOREARM 2 VIEWS LT ORDERED BY: LAVELLE DELANEY  ACC: [id number] EXAM: XR WRIST COMP MIN 3 VIEWS LT ORDERED BY: LAVELLE DELANEY  PROCEDURE DATE: 04/28/2025    INTERPRETATION: Clinical history: 62-year-old female, injury.  Three views of the left wrist and 2 views of left forearm.  FINDINGS: Severely comminuted/impacted distal radial fracture with dorsal angulation of the segments.  Minimally displaced ulnar styloid process fracture.  IMPRESSION:  Comminuted/intra-articular distal radial fracture with impaction.  Minimally displaced ulnar styloid process fracture  --- End of Report ---

## 2025-05-01 NOTE — HISTORY OF PRESENT ILLNESS
[de-identified] : Pt is a 61 y/o female with left distal radius fracture.  She fell in her kitchen yesterday and landed on an outstretched left hand.  She had pain immediately.  She went to [place] ED where xrays revealed a right distal radius fracture.  A splint was applied and she was advised to follow up with a specialist. Gave oxycodon from hospital . She reports that she had a prior liver transplant.

## 2025-05-01 NOTE — DISCUSSION/SUMMARY
[de-identified] : The underlying pathophysiology was reviewed with the patient. XR films were reviewed with the patient. Discussed at length the nature of the patients condition. The left wrist symptoms are secondary to left distal radius fracture.   Treatment options were discussed including; closed reduction/cast treatment, surgical intervention with ORIF distal radius fracture and hardware placement.  A new left short arm splint and sling were applied for support.   The patient wishes to proceed with ORIF of the left distal radius fracture at this time. The risks and benefits were reviewed with the patient.  All of his questions were answered. She will meet with our surgical scheduler.  Patient was advised to try OTC medication and topical analgesics for pain management. I recommend that the patient utilize Tylenol, Advil, Aleve,   RX: Ibuprofen 800   All questions answered, understanding verbalized. Patient in agreement with plan of care. The patient can follow-up post-operatively.  If the patient begins to experience any changes or severe exacerbation of her symptoms, she should reach out to me as soon as possible.

## 2025-05-01 NOTE — ADDENDUM
[FreeTextEntry1] : I, Portia Castro wrote this note acting as a scribe for Dr. Jose Shelton on 04/29/2025.   All medical record entries made by the Scribe were at my, Dr. Jose Shelton MD., direction and personally dictated by me on 04/29/2025. I have personally reviewed the chart and agree that the record accurately reflects my personal performance of the history, physical exam, assessment and plan.

## 2025-05-01 NOTE — RESULTS/DATA
[de-identified] : 04/30/2025: Results within acceptable range [] : results reviewed [de-identified] : 04/30/2025: Elevated LFT, will need Hepatology clearance [de-identified] : 02/2025: NSR 85 bpm, low voltage, non specific T waves

## 2025-05-15 NOTE — DISCUSSION/SUMMARY
[de-identified] : The underlying pathophysiology was reviewed with the patient. XR films were reviewed with the patient. Discussed at length the nature of the patients condition. The left wrist symptoms are secondary to left distal radius fracture  I reassured the patient that her residual symptoms are within the nature of her procedure. She is not a candidate for surgery as she was not cleared by the medical physician.  A left short arm cast was applied in office today 5/15/2025. Cast instruction were given (i.e. keeping it dry, using a cast shower bag). The patient was instructed on ROM exercises of the shoulder, elbow, hand and wrist while casted. Furthermore, I recommended elevation and pumping of the hand to reduce edema if the cast becomes tight.  RX: Ibuprofen 400 mg; The patient needs to be cleared to take the ibuprofen due to liver problems by her PCP.   No restriction on ADLs.   All questions answered, understanding verbalized. Patient in agreement with plan of care. The patient can follow-up in 3 weeks.   If the patient begins to experience any changes or severe exacerbation of her symptoms, she should reach out to me as soon as possible.

## 2025-05-15 NOTE — ADDENDUM
[FreeTextEntry1] : I, Portai Castro wrote this note acting as a scribe for Dr. Jose Shelton on 05/15/2025.   All medical record entries made by the Scribe were at my, Dr. Jose Shelton MD., direction and personally dictated by me on 05/15/2025. I have personally reviewed the chart and agree that the record accurately reflects my personal performance of the history, physical exam, assessment and plan.

## 2025-05-15 NOTE — HISTORY OF PRESENT ILLNESS
[de-identified] : Pt is a 61 y/o female with left distal radius fracture.  She fell in her kitchen yesterday and landed on an outstretched left hand.  She had pain immediately.  She went to [place] ED where xrays revealed a right distal radius fracture.  A splint was applied. She had been given option of surgery at the [place] office but she was not cleared by her liver specialist. She had closed reduction and application of a sugartong splint at [place] where she was admitted by the medical service. She is here for followup. The patient is behaving aggressively and rudely in the waiting room and in the exam room. Security was called and was present during the visit.

## 2025-05-15 NOTE — PHYSICAL EXAM
[de-identified] : Patient is WDWN, alert, and in no acute distress. Breathing is unlabored. She is grossly oriented to person, place, and time. She is argumentative with multiple members of the staff.  Left Wrist -  Inspection/Palpation: The splint was removed. Mild edema, mild deformity noted  Range of Motion: Limited by pain. Full in digits Sensation is intact.    [de-identified] : AP, lateral and oblique views of the left wrist were obtained today and revealed comminuted/impacted distal radial fracture with dorsal angulation of the segments and a minimally displaced ulnar styloid process fracture . Alignment is acceptable   ACC: [id number] EXAM: XR FOREARM 2 VIEWS LT ORDERED BY: LAVELLE DELANEY  ACC: [id number] EXAM: XR WRIST COMP MIN 3 VIEWS LT ORDERED BY: LAVELLE DELANEY  PROCEDURE DATE: 04/28/2025    INTERPRETATION: Clinical history: 62-year-old female, injury.  Three views of the left wrist and 2 views of left forearm.  FINDINGS: Severely comminuted/impacted distal radial fracture with dorsal angulation of the segments.  Minimally displaced ulnar styloid process fracture.  IMPRESSION:  Comminuted/intra-articular distal radial fracture with impaction.  Minimally displaced ulnar styloid process fracture  --- End of Report ---

## 2025-05-20 NOTE — HISTORY OF PRESENT ILLNESS
[de-identified] : Pt is a 63 y/o female with left distal radius fracture.  She fell in her kitchen yesterday and landed on an outstretched left hand.  She had pain immediately.  She went to [place] ED where xrays revealed a right distal radius fracture.  A splint was applied. She had been given option of surgery at the [place] office but she was not cleared by her liver specialist. She had closed reduction and application of a sugartong splint at [place] where she was admitted by the medical service. She is here for followup. The patient is behaving aggressively and rudely in the waiting room and in the exam room. Security was called and was present during the visit.  Today, 05/20/2025, the patient presents for a follow-up evaluation and further care. She reports she was feeling well for the first few days after cast placement. The pain then began around her wrist and is exacerbated at night. She feels like the cast is cutting into her skin. She called  who informed her that she may go to the ER as the cast may be too tight. However, she decided to wait until today's visit. Ibuprofen did not alleviate her symptoms but Oxycodon given by the hospital did help.   She is accompanied by security but is behaving calmly.She complained of cast edge irritation which was treated with mole skin padding. However, she did leave before we could take x rays saying she had another appt (she was 40 minutes late to our appt).

## 2025-05-20 NOTE — ADDENDUM
[FreeTextEntry1] : I, Portia Castro wrote this note acting as a scribe for Dr. Jose Shelton on 05/20/2025.   All medical record entries made by the Scribe were at my, Dr. Jose Shelton MD., direction and personally dictated by me on 05/20/2025. I have personally reviewed the chart and agree that the record accurately reflects my personal performance of the history, physical exam, assessment and plan.

## 2025-05-20 NOTE — DISCUSSION/SUMMARY
[de-identified] : The underlying pathophysiology was reviewed with the patient. XR were not reviewed with the patient as she left prior to taking them. Discussed at length the nature of the patients condition. The left wrist symptoms are secondary to left distal radius fracture  Treatment option of cast replacement or moleskin padding was discussed.   The left short arm cast was adjusted with moleskin padding in office today 5/20/2025. Furthermore, I recommended elevation and pumping of the hand to reduce edema if the cast becomes tight.  I reassured the patient that her residual symptoms are within the nature of her fracture.   No restriction on ADLs with cast one  All questions answered, understanding verbalized. Patient in agreement with plan of care. The patient can follow-up in 1-3 weeks.   If the patient begins to experience any changes or severe exacerbation of her symptoms, she should reach out to me as soon as possible.

## 2025-05-20 NOTE — PHYSICAL EXAM
[de-identified] : Patient is WDWN, alert, and in no acute distress. Breathing is unlabored. She is grossly oriented to person, place, and time.  Left Wrist -  Inspection/Palpation: Short arm cast intact. Moleskin padding applied.  Range of Motion: Full in digits Sensation is intact.    [de-identified] : No new imaging taken today (patient left early before xrays could be obtained).    AP, lateral and oblique views of the left wrist were obtained today and revealed comminuted/impacted distal radial fracture with dorsal angulation of the segments and a minimally displaced ulnar styloid process fracture . Alignment is acceptable   ACC: [id number] EXAM: XR FOREARM 2 VIEWS LT ORDERED BY: LAVELLE DELANEY ACC: [id number] EXAM: XR WRIST COMP MIN 3 VIEWS LT ORDERED BY: LAVELLE DELANEY PROCEDURE DATE: 04/28/2025 INTERPRETATION: Clinical history: 62-year-old female, injury. Three views of the left wrist and 2 views of left forearm. FINDINGS: Severely comminuted/impacted distal radial fracture with dorsal angulation of the segments. Minimally displaced ulnar styloid process fracture. IMPRESSION: Comminuted/intra-articular distal radial fracture with impaction.  Minimally displaced ulnar styloid process fracture  --- End of Report ---

## 2025-05-27 NOTE — HISTORY OF PRESENT ILLNESS
[de-identified] : Pt is a 63 y/o female with left distal radius fracture.  She fell in her kitchen yesterday and landed on an outstretched left hand.  She had pain immediately.  She went to [place] ED where xrays revealed a right distal radius fracture.  A splint was applied. She had been given option of surgery at the [place] office but she was not cleared by her liver specialist. She had closed reduction and application of a sugartong splint at [place] where she was admitted by the medical service. She is here for followup. The patient is behaving aggressively and rudely in the waiting room and in the exam room. Security was called and was present during the visit.  Today, 05/20/2025, the patient presents for a follow-up evaluation and further care. She reports she was feeling well for the first few days after cast placement. The pain then began around her wrist and is exacerbated at night. She feels like the cast is cutting into her skin. She called  who informed her that she may go to the ER as the cast may be too tight. However, she decided to wait until today's visit. Ibuprofen did not alleviate her symptoms but Oxycodon given by the hospital did help.   She is accompanied by security but is behaving calmly. She complained of cast edge irritation which was treated with mole skin padding. However, she did leave before we could take x rays saying she had another appt (she was 40 minutes late to our appt).   Today, 05/27/2025, the patient presents for a follow-up evaluation and further care. Her left short arm cast slipped off yesterday. (She came as a walk-in and is behaving politely. No security present). She apologized for her past behavior.

## 2025-05-27 NOTE — ADDENDUM
[FreeTextEntry1] : I, Portia Castro wrote this note acting as a scribe for Dr. Jose Shelton on 05/27/2025.   All medical record entries made by the Scribe were at my, Dr. Jose Shelton MD., direction and personally dictated by me on 05/27/2025. I have personally reviewed the chart and agree that the record accurately reflects my personal performance of the history, physical exam, assessment and plan.

## 2025-05-27 NOTE — PHYSICAL EXAM
[de-identified] : Patient is WDWN, alert, and in no acute distress. Breathing is unlabored. She is grossly oriented to person, place, and time.  Left Wrist -  Inspection/Palpation: Tenderness over distal radius. Mild edema, no ecchymosis. Deformity present.  Range of Motion: Full in digits; limited supination/pronation. Sensation is intact.    [de-identified] : AP, lateral and oblique views of the left wrist were obtained today and revealed comminuted/impacted distal radial fracture with dorsal angulation of the distal segment and a minimally displaced ulnar styloid process fracture .

## 2025-05-27 NOTE — DISCUSSION/SUMMARY
[de-identified] : The underlying pathophysiology was reviewed with the patient. XR were not reviewed with the patient as she left prior to taking them. Discussed at length the nature of the patients condition. The left wrist symptoms are secondary to left distal radius fracture.  I reassured the patient that her residual symptoms are within the nature of her fracture and will heal with immobilization and time. However, a deformity is present due to a bone shift. Surgical intervention is not recommended due to her liver comorbidities.   Treatment options were discussed including; cast placement  A new left short arm cast was applied in office today 5/27/2025. Cast instruction were given (i.e. keeping it dry, using a cast shower bag). The patient was instructed on ROM exercises of the shoulder, elbow, hand and wrist while casted. Furthermore, I recommended elevation and pumping of the hand to reduce edema if the cast becomes tight.  Gentle range of motion, stretching, and strengthening exercises were encouraged. Increase activity as tolerated.  No restriction on ADLs with cast on.   All questions answered, understanding verbalized. Patient in agreement with plan of care. The patient can follow-up in 4 weeks for cast removal and repeat x rays.  If the patient begins to experience any changes or severe exacerbation of her symptoms, she should reach out to me as soon as possible.

## 2025-07-01 NOTE — ADDENDUM
[FreeTextEntry1] : I, Portia Castro wrote this note acting as a scribe for Dr. Jose Shelton on 07/01/2025.   All medical record entries made by the Scribe were at my, Dr. Jose Shelton MD., direction and personally dictated by me on 07/01/2025. I have personally reviewed the chart and agree that the record accurately reflects my personal performance of the history, physical exam, assessment and plan.

## 2025-07-01 NOTE — PHYSICAL EXAM
[de-identified] : Patient is WDWN, alert, and in no acute distress. Breathing is unlabored. She is grossly oriented to person, place, and time.  Left Wrist -  Inspection/Palpation: Cast is removed. Mild tenderness over distal radius. Mild edema, no ecchymosis. Deformity present.  Range of Motion: Full in digits; limited supination/pronation. Sensation is intact.    [de-identified] : AP, lateral and oblique views of the left wrist were obtained today and revealed comminuted/impacted distal radial fracture with dorsal angulation of the distal segment and a minimally displaced ulnar styloid process fracture fully healed.

## 2025-07-01 NOTE — DISCUSSION/SUMMARY
[de-identified] : The underlying pathophysiology was reviewed with the patient. XR were not reviewed with the patient as she left prior to taking them. Discussed at length the nature of the patients condition. The left wrist symptoms are secondary to left distal radius fracture.  I reassured the patient that her fracture is fully healed.   The left short arm cast was removed today 07/01/2025. The patient was instructed on keeping the area dry. They may then begin to massage the area with vitamin E oil or lotion. Gentle range of motion, stretching, and strengthening exercises were encouraged. Patient should actively work on gradually increasing use of the hand, as tolerated.  A left short arm splint was applied in office today for further support. She may use a left wrist metal brace instead for comfort.  RX: Left OT script given WBAT.  No restriction on ADLs.   All questions answered, understanding verbalized. Patient in agreement with plan of care. Patient is advised to follow-up in 6 weeks or as needed.  If the patient begins to experience any changes or severe exacerbation of her symptoms, she should reach out to me as soon as possible.

## 2025-07-01 NOTE — HISTORY OF PRESENT ILLNESS
[de-identified] : Pt is a 61 y/o female with left distal radius fracture.  She fell in her kitchen yesterday and landed on an outstretched left hand.  She had pain immediately.  She went to [place] ED where xrays revealed a right distal radius fracture.  A splint was applied. She had been given option of surgery at the [place] office but she was not cleared by her liver specialist. She had closed reduction and application of a sugartong splint at [place] where she was admitted by the medical service. She is here for followup. The patient is behaving aggressively and rudely in the waiting room and in the exam room. Security was called and was present during the visit.  Today, 05/20/2025, the patient presents for a follow-up evaluation and further care. She reports she was feeling well for the first few days after cast placement. The pain then began around her wrist and is exacerbated at night. She feels like the cast is cutting into her skin. She called  who informed her that she may go to the ER as the cast may be too tight. However, she decided to wait until today's visit. Ibuprofen did not alleviate her symptoms but Oxycodon given by the hospital did help.   She is accompanied by security but is behaving calmly. She complained of cast edge irritation which was treated with mole skin padding. However, she did leave before we could take x rays saying she had another appt (she was 40 minutes late to our appt).   Today, 05/27/2025, the patient presents for a follow-up evaluation and further care. Her left short arm cast slipped off yesterday. (She came as a walk-in and is behaving politely. No security present). She apologized for her past behavior.   Today, 07/01/2025, the patient presents for a follow-up evaluation and further care. She is here for cast removal. She has had some discomfort with the cast.  (No security present, she is polite today).

## 2025-07-02 NOTE — PHYSICAL EXAM
[TextEntry] : She has evidence of a evolving left inferior medial skin ecchymosis suggestivebrui General: No acute distress, Non-toxic appearing Psych: Alert and oriented x 3, with normal mood and affect Pulmonary: Non-labored respirations, Breathing comfortably on room air Cardio-vascular: No cyanosis, No evidence of poor perfusion to hands or face Eyes: Extra-ocular movements intact.  Surgical site: Bilateral Wise pattern breast incision intact and well-approximated with no evidence of vertical limb and/or T-junction dehiscence. Incisions are healing nicely with no clinical evidence to suggest infection. Right NAC sensation intact. Left NAC sensation significantly diminished. She has evidence of right inferomedial bruising suggestive of a recent trauma.  Patient denied any recent trauma in the last couple of weeks. Left breast mound with firm underlying breast tissue mass In the superior lateral segment of the breast consistent with likely fat necrosis that is the size of a small golf ball. No fluid collection.

## 2025-07-02 NOTE — ASSESSMENT
[FreeTextEntry1] : [Janet Parra] is a [62]-year-old [female] who is post-operative following [Bilateral breast reduction] on [01/13/2025], presenting today for their [13] weeks follow-up appointment. Patient appears to be doing well and is very satisfied. Her incisions are intact bilaterally with no clinical signs to suggest infection. Patient's course is notable for likely recent trauma in her right breast that is likely contributing to her right breast soreness.  Of note, she has developed persistent fat necrosis consistent with the size of a small golf ball in her left superior lateral breast which at this time is not causing any pain or discomfort.  I discussed with the patient regarding continued massage up until year and if no further improvement which the likelihood of that occurring is marginal, we can potentially plan for return to the OR for lysis of fat necrosis with possible pickle fork and/or liposuction cannula.  However, patient reports that the aforementioned currently does not bother her and in light of her other comorbidities to include liver transplant and the surrounding risk of anesthesia; she likely will not pursue any further intervention.  - Postoperative Care and Recommendations  - 6 months photographs obtained  - Maintain Scar Precautions: Avoid direct sunlight on the scar for at least six months, as sun exposure can darken or highlight scar tissue.  - Scar Massage: Two weeks post-surgery, begin gentle scar massage to help break down scar tissue and improve flexibility. Use small, circular motions with gentle pressure.  - Sun Protection for Scars: Once healed enough, apply SPF 30 or higher on the scar to shield from UV exposure, which may darken scar tissue.  - Follow-Up Appointment:6 months

## 2025-07-02 NOTE — HISTORY OF PRESENT ILLNESS
[FreeTextEntry1] : [Janet Parra] is a [62]-year-old [female] who is post-operative following [Bilateral breast reduction] on [01/13/2025], presenting today for their [~23] weeks follow-up appointment.  07/02: Patient presents today for follow-up appointment.  She reports right breast soreness that has been present for the last 3 weeks as well as persistent left superior lateral pole firmness that is yet to soft and despite aggressive massage.  She intermittently wears a supportive bra.  Of note, patient reports she was recently released from a left forearm cast after a distal radius fracture.  She denies any other issues.  04/22: Patient rescheduled her previous appointment scheduled for April 9, 2025 and April 16, 2025.  She presents today for her follow-up appointment. Patient reports she sustained a bad fall in the bathroom sometime ago which resulted in some bruising but no any other injuries.  Overall, she reports that she has been doing well.  She feels the left breast feels firmer than the right.  She also reports significant diminished sensation on the left NAC compared to the right.  She has been on and off with scar massage. She reports she wears a supportive bra but was not wearing one today. She denies any other issues.  03/05: Patient presents today for a 7 weeks follow-up appointment.  Of note, since she was last seen she was admitted to [facility] on 02/11/2025 as a result of melena and symptomatic acute on chronic anemia requiring blood transfusion and subsequent EGD that demonstrated atrophic gastritis with no other source of GI bleeding identified. She has since been discharged with outpatient follow-up with gastroenterology.  As a pertains of breast, she reports the left lateral breast mound feels slightly firm.  She denies any constitutional and/or local symptoms of infection.  She reports intact sensation in bilateral nipples.  She reports her incision continues to heal well without any issues.  She is very satisfied with the appearance of her breast.   01/29: Patient was seen in the office today.  She appeared in good spirits.  She reports no postoperative pain since surgery.  She denies any changes in sensation.  She is very happy about undergoing surgery and feels that she can stand up straighter.  She has been wearing her supportive postoperative bra. She reports she wears her supportive bra over A T-shirt because the Velcro on the superior aspect of her bra irritates her skin when in direct contact. Patient reports she is happy with the way her breasts feels but currently too shy to look herself in the mirror. Patient reports she continues to shower but after she noted a small serosanguineous staining on her bra she avoided showering for some days since she was afraid she may hurt something.  Of note, patient reported that she has followed up with her transplant surgeon who completed her hernia repair and per report she is doing well with no issues.

## 2025-07-02 NOTE — HISTORY OF PRESENT ILLNESS
[FreeTextEntry1] : [Janet Parra] is a [62]-year-old [female] who is post-operative following [Bilateral breast reduction] on [01/13/2025], presenting today for their [~23] weeks follow-up appointment.  07/02: Patient presents today for follow-up appointment.  She reports right breast soreness that has been present for the last 3 weeks as well as persistent left superior lateral pole firmness that is yet to soft and despite aggressive massage.  She intermittently wears a supportive bra.  Of note, patient reports she was recently released from a left forearm cast after a distal radius fracture.  She denies any other issues.  04/22: Patient rescheduled her previous appointment scheduled for April 9, 2025 and April 16, 2025.  She presents today for her follow-up appointment. Patient reports she sustained a bad fall in the bathroom sometime ago which resulted in some bruising but no any other injuries.  Overall, she reports that she has been doing well.  She feels the left breast feels firmer than the right.  She also reports significant diminished sensation on the left NAC compared to the right.  She has been on and off with scar massage. She reports she wears a supportive bra but was not wearing one today. She denies any other issues.  03/05: Patient presents today for a 7 weeks follow-up appointment.  Of note, since she was last seen she was admitted to [facility] on 02/11/2025 as a result of melena and symptomatic acute on chronic anemia requiring blood transfusion and subsequent EGD that demonstrated atrophic gastritis with no other source of GI bleeding identified. She has since been discharged with outpatient follow-up with gastroenterology.  As a pertains of breast, she reports the left lateral breast mound feels slightly firm.  She denies any constitutional and/or local symptoms of infection.  She reports intact sensation in bilateral nipples.  She reports her incision continues to heal well without any issues.  She is very satisfied with the appearance of her breast.   01/29: Patient was seen in the office today.  She appeared in good spirits.  She reports no postoperative pain since surgery.  She denies any changes in sensation.  She is very happy about undergoing surgery and feels that she can stand up straighter.  She has been wearing her supportive postoperative bra. She reports she wears her supportive bra over A T-shirt because the Velcro on the superior aspect of her bra irritates her skin when in direct contact. Patient reports she is happy with the way her breasts feels but currently too shy to look herself in the mirror. Patient reports she continues to shower but after she noted a small serosanguineous staining on her bra she avoided showering for some days since she was afraid she may hurt something.  Of note, patient reported that she has followed up with her transplant surgeon who completed her hernia repair and per report she is doing well with no issues.

## 2025-07-30 NOTE — PHYSICAL EXAM
[TextEntry] : She has evidence of a evolving left inferior medial skin ecchymosis suggestivebrui General: No acute distress, Non-toxic appearing Psych: Alert and oriented x 3, with normal mood and affect Pulmonary: Non-labored respirations, Breathing comfortably on room air Cardio-vascular: No cyanosis, No evidence of poor perfusion to hands or face Eyes: Extra-ocular movements intact.  Surgical site: Bilateral Wise pattern breast incision intact and well-approximated with no evidence of vertical limb and/or T-junction dehiscence. Incisions are healing nicely with no clinical evidence to suggest infection. Right NAC sensation intact. Left NAC sensation slightly diminished. Right inferomedial bruising previously noted has since resolved.  Breast parenchyma soft and pliable with no palpable mass and/or lumps. Left breast mound with firm underlying breast tissue mass in the superior lateral segment of the breast consistent with likely fat necrosis that is the size of a small golf ball. No fluid collection.

## 2025-07-30 NOTE — ASSESSMENT
[FreeTextEntry1] : [Janet Parra] is a [62]-year-old [female] who is post-operative following [Bilateral breast reduction] on [01/13/2025], presenting today for their 6 months follow-up appointment.   Patient reports that she has attained significant relief from her back and shoulder pain since her bilateral breast reduction surgery.  However, the persistent solid lump presumed to be likely fat necrosis in the left superior lateral breast pole although not currently symptomatic is of concern and she would like to explore her options and discuss next steps.   Of note, she obtained a CT abdomen pelvis on February 11, 2025 when she was admitted for melena with incidental finding of right lateral chest wall mild to moderate collection likely seroma with minimal collection on the left with some fat stranding.  No organized collection was noted on the left side.  Imaging was reviewed with senior surgeon Jose Hamilton MD. Patient was offered the option of watchful waiting for another 6 months with possible return to the OR for lysis of fat necrosis with pickle fork versus excision.  Patient at this time is unsure if she would like to proceed with another surgical interventions as it will entail anesthesia in the setting of a prior liver transplantation.   She however will want to proceed with a breast MRI to evaluate the site as well as referral to the Chief of plastic surgery for second opinion.  Case was discussed with Nena Hamilton MD in he is in agreement with evaluating her after she obtains the MRI.  Patient was offered the option of returning if she decides to proceed with revisional surgery versus continuing treatment with Dr. Hamilton.

## 2025-07-30 NOTE — HISTORY OF PRESENT ILLNESS
[FreeTextEntry1] : [Janet Parra] is a [62]-year-old [female] who is post-operative following [Bilateral breast reduction] on [01/13/2025], presenting today for their 6 months follow-up appointment.  07/30: Patient presents for follow-up appointment.  She continues to have palpable firmness in the left superior lateral breast pole.  She has been massaging the site as well as wearing supportive bra with no notable improvement.  The firmness feels like a golf ball, and it is presumed to be likely fat necrosis.  Patient denies palpable pain but does report it feels uncomfortable whenever she lays on that side. Today, she is seeking further imaging of the site, possible options to address the fat necrosis as well as referral for a second opinion to discuss her options.  07/02: Patient presents today for follow-up appointment.  She reports right breast soreness that has been present for the last 3 weeks as well as persistent left superior lateral pole firmness that is yet to soft and despite aggressive massage.  She intermittently wears a supportive bra.  Of note, patient reports she was recently released from a left forearm cast after a distal radius fracture.  She denies any other issues.  04/22: Patient rescheduled her previous appointment scheduled for April 9, 2025 and April 16, 2025.  She presents today for her follow-up appointment. Patient reports she sustained a bad fall in the bathroom sometime ago which resulted in some bruising but no any other injuries.  Overall, she reports that she has been doing well.  She feels the left breast feels firmer than the right.  She also reports significant diminished sensation on the left NAC compared to the right.  She has been on and off with scar massage. She reports she wears a supportive bra but was not wearing one today. She denies any other issues.  03/05: Patient presents today for a 7 weeks follow-up appointment.  Of note, since she was last seen she was admitted to [facility] on 02/11/2025 as a result of melena and symptomatic acute on chronic anemia requiring blood transfusion and subsequent EGD that demonstrated atrophic gastritis with no other source of GI bleeding identified. She has since been discharged with outpatient follow-up with gastroenterology.  As a pertains of breast, she reports the left lateral breast mound feels slightly firm.  She denies any constitutional and/or local symptoms of infection.  She reports intact sensation in bilateral nipples.  She reports her incision continues to heal well without any issues.  She is very satisfied with the appearance of her breast.   01/29: Patient was seen in the office today.  She appeared in good spirits.  She reports no postoperative pain since surgery.  She denies any changes in sensation.  She is very happy about undergoing surgery and feels that she can stand up straighter.  She has been wearing her supportive postoperative bra. She reports she wears her supportive bra over A T-shirt because the Velcro on the superior aspect of her bra irritates her skin when in direct contact. Patient reports she is happy with the way her breasts feels but currently too shy to look herself in the mirror. Patient reports she continues to shower but after she noted a small serosanguineous staining on her bra she avoided showering for some days since she was afraid she may hurt something.  Of note, patient reported that she has followed up with her transplant surgeon who completed her hernia repair and per report she is doing well with no issues.